# Patient Record
Sex: FEMALE
[De-identification: names, ages, dates, MRNs, and addresses within clinical notes are randomized per-mention and may not be internally consistent; named-entity substitution may affect disease eponyms.]

---

## 2017-08-13 ENCOUNTER — CHARTING TRANS (OUTPATIENT)
Dept: OTHER | Age: 47
End: 2017-08-13

## 2017-08-13 ASSESSMENT — PAIN SCALES - GENERAL: PAINLEVEL_OUTOF10: 0

## 2018-11-03 VITALS
OXYGEN SATURATION: 99 % | TEMPERATURE: 97.5 F | RESPIRATION RATE: 16 BRPM | BODY MASS INDEX: 34.13 KG/M2 | WEIGHT: 169.31 LBS | DIASTOLIC BLOOD PRESSURE: 74 MMHG | HEART RATE: 54 BPM | SYSTOLIC BLOOD PRESSURE: 122 MMHG | HEIGHT: 59 IN

## 2024-08-03 ENCOUNTER — HOSPITAL ENCOUNTER (OUTPATIENT)
Age: 54
Discharge: HOME | End: 2024-08-03
Payer: COMMERCIAL

## 2024-08-03 VITALS
SYSTOLIC BLOOD PRESSURE: 133 MMHG | TEMPERATURE: 97 F | OXYGEN SATURATION: 99 % | RESPIRATION RATE: 20 BRPM | DIASTOLIC BLOOD PRESSURE: 60 MMHG | HEART RATE: 57 BPM

## 2024-08-03 DIAGNOSIS — S76.312A STRAIN OF LEFT HAMSTRING, INITIAL ENCOUNTER: Primary | ICD-10-CM

## 2024-08-03 PROCEDURE — 99203 OFFICE O/P NEW LOW 30 MIN: CPT | Performed by: PHYSICIAN ASSISTANT

## 2024-08-03 RX ORDER — METHYLPREDNISOLONE 4 MG/1
TABLET ORAL
Qty: 1 EACH | Refills: 0 | Status: SHIPPED | OUTPATIENT
Start: 2024-08-03

## 2024-08-03 NOTE — ED INITIAL ASSESSMENT (HPI)
Pt here with left hamstring injury. Pt was at yoga last night and fell from standing pose and over stretched  left hamstring.

## 2024-08-03 NOTE — ED PROVIDER NOTES
Patient Seen in: [facility]      History     Chief Complaint   Patient presents with    Leg or Foot Injury     Stated Complaint: left leg injury    Subjective:   HPI    Patient is a 54-year-old female, presenting to immediate care for evaluation of acute left posterior hamstring pain after mechanical fall yesterday during yoga class.  Patient states 2 of his straps landed on the floor causing her to fall.  Since has been having sharp pain on left posterior thigh.  Pain with palpation and with active range of motion.  Taking ibuprofen for pain with minimal relief.  Coming to immediate care for further evaluation and examination.  Pain noted with rest.  Denies any left hip, knee, ankle, foot pain.  No associated swelling.  No bruising.  No rash.  No numbness weakness paresthesias.  Ambulate steady gait without assistance.  Denies prior episodes    Objective:   History reviewed. No pertinent past medical history.           History reviewed. No pertinent surgical history.             Social History     Socioeconomic History    Marital status: Unknown   Tobacco Use    Smoking status: Never    Smokeless tobacco: Never              Review of Systems   Constitutional:  Positive for activity change.   Musculoskeletal:  Positive for gait problem. Negative for joint swelling.        Left posterior thigh pain   Skin:  Negative for rash and wound.   Neurological:  Negative for weakness and numbness.   Psychiatric/Behavioral:  Negative for confusion.        Positive for stated Chief Complaint: Leg or Foot Injury    Other systems are as noted in HPI.  Constitutional and vital signs reviewed.      All other systems reviewed and negative except as noted above.    Physical Exam     ED Triage Vitals [08/03/24 1310]   /60   Pulse 57   Resp 20   Temp 97.3 °F (36.3 °C)   Temp src Temporal   SpO2 99 %   O2 Device None (Room air)       Current Vitals:   Vital Signs  BP: 133/60  Pulse: 57  Resp: 20  Temp: 97.3 °F (36.3  °C)  Temp src: Temporal    Oxygen Therapy  SpO2: 99 %  O2 Device: None (Room air)            Physical Exam  Vitals and nursing note reviewed.   Constitutional:       General: She is not in acute distress.     Appearance: Normal appearance. She is not ill-appearing, toxic-appearing or diaphoretic.   HENT:      Head: Normocephalic and atraumatic.      Mouth/Throat:      Mouth: Mucous membranes are moist.   Eyes:      Conjunctiva/sclera: Conjunctivae normal.   Cardiovascular:      Pulses: Normal pulses.   Pulmonary:      Effort: No respiratory distress.   Musculoskeletal:         General: Tenderness and signs of injury present. No swelling or deformity. Normal range of motion.      Comments: Tenderness to palpation left posterior hamstring without obvious deformity or swelling.  Tendon intact.  No erythema or warmth pain or hematoma.  No focal tenderness to palpation left hip, knee, tibia/fibula, ankle, foot.  Neurovasc intact compartments soft.  Capillary Flextra seconds.  No cool or warm extremity   Neurological:      General: No focal deficit present.      Mental Status: She is alert and oriented to person, place, and time.      Motor: No weakness.      Gait: Gait abnormal.      Comments: No gross focal deficit or weakness.  Ambulating with slow assistance.   Psychiatric:         Mood and Affect: Mood normal.         Behavior: Behavior normal.             ED Course   Labs Reviewed - No data to display         MDM     Patient is a 54-year-old female, presenting to immediate care for evaluation of acute posterior left thigh pain status post slipping/falling injury yesterday in yoga class.  Exam consistent with left hip strain.  Will treat outpatient supportively.  RICE therapy.  NSAID therapy for pain.  Medrol Dosepak steroid for anti-inflammatory.  Outpatient MRI for persistent symptoms.  Outpatient PT.  PCP follow-up.           Medical Decision Making      Disposition and Plan     Clinical Impression:  1. Strain of  left hamstring, initial encounter         Disposition:  Discharge  8/3/2024  1:44 pm    [facility] [address]  [place] [address]  Schedule an appointment as soon as possible for a visit   Orthopedics. Phone: [phone]          Medications Prescribed:  Current Discharge Medication List        START taking these medications    Details   methylPREDNISolone (MEDROL) 4 MG Oral Tablet Therapy Pack Dosepack: take as directed  Qty: 1 each, Refills: 0